# Patient Record
Sex: MALE
[De-identification: names, ages, dates, MRNs, and addresses within clinical notes are randomized per-mention and may not be internally consistent; named-entity substitution may affect disease eponyms.]

---

## 2020-08-05 NOTE — EDM.PDOC
ED HPI GENERAL MEDICAL PROBLEM





- General


Chief Complaint: General


Stated Complaint: ABDOMINAL PAIN


Time Seen by Provider: 08/05/20 21:50


Source of Information: Reports: Patient





- History of Present Illness


Onset: Today, Sudden


Duration: Hour(s): (abdominal pain with diarrea started late this morning and 

resolved around 2pm; had approximately 5 loose stools, non-bloody), Getting W

orse


Location: Reports: Abdomen


Quality: Reports: Same as Previous Episode, Sharp


Severity: Severe


Improves with: Reports: None


Worsens with: Reports: None


Associated Symptoms: Reports: No Other Symptoms


Other Treatments PTA: nexium


  ** Middle Abdomen


Pain Score (Numeric/FACES): 9





- Related Data


                                    Allergies











Allergy/AdvReac Type Severity Reaction Status Date / Time


 


Iodinated Contrast Media Allergy  Nausea Verified 08/05/20 21:48











Home Meds: 


                                    Home Meds





Cetirizine [ZyrTEC] 10 mg PO DAILY 08/05/20 [History]


Esomeprazole [NexIUM] 20 mg PO DAILY 08/05/20 [History]


Fluticasone Propionate [Flonase] 16 gm NS DAILY PRN 08/05/20 [History]


Levothyroxine 112 mcg PO ACBREAKFAST 08/05/20 [History]


Meloxicam 15 mg PO DAILY 08/05/20 [History]


Ranitidine [Zantac] 75 mg PO DAILY 08/05/20 [History]











Past Medical History


Gastrointestinal History: Reports: Cholelithiasis, GI Bleed, Irritable Bowel 

Syndrome, PUD





Social & Family History





- Tobacco Use


Smoking Status *Q: Never Smoker


Second Hand Smoke Exposure: No





- Caffeine Use


Caffeine Use: Reports: None





- Recreational Drug Use


Recreational Drug Use: No





ED ROS GENERAL





- Review of Systems


Review Of Systems: See Below


Constitutional: Denies: Fever, Weakness, Diaphoresis, Weight Loss


HEENT: Reports: No Symptoms


Respiratory: Reports: No Symptoms


Cardiovascular: Reports: No Symptoms


GI/Abdominal: Reports: Abdominal Pain, Diarrhea.  Denies: Black Stool, Bloody 

Stool, Nausea, Vomiting


: Reports: No Symptoms


Musculoskeletal: Reports: No Symptoms


Skin: Reports: No Symptoms


Neurological: Reports: No Symptoms


Psychiatric: Reports: No Symptoms


Hematologic/Lymphatic: Reports: No Symptoms





ED EXAM, GI/ABD





- Physical Exam


Exam: See Below


Exam Limited By: No Limitations


General Appearance: Alert, WD/WN, Severe Distress





Course





- Vital Signs


Last Recorded V/S: 


                                Last Vital Signs











Temp  97.9 F   08/05/20 21:45


 


Pulse  69   08/05/20 23:15


 


Resp  16   08/05/20 23:15


 


BP  107/70   08/05/20 23:15


 


Pulse Ox  100   08/05/20 23:15














- Orders/Labs/Meds


Orders: 


                               Active Orders 24 hr











 Category Date Time Status


 


 EKG Documentation Completion [RC] ASDIRECTED Care  08/05/20 22:39 Active


 


 Abdomen Pelvis wo Cont [CT] Stat Exams  08/05/20 22:05 Taken


 


 Chest 1V Frontal [CR] Stat Exams  08/05/20 22:36 Taken


 


 Peripheral IV Insertion Adult [OM.PC] Urgent Oth  08/05/20 22:05 Ordered











Labs: 


                                Laboratory Tests











  08/05/20 08/05/20 08/05/20 Range/Units





  22:40 22:40 22:45 


 


WBC  12.2 H D    (4.0-11.0)  K/uL


 


RBC  4.56    (4.50-6.50)  M/uL


 


Hgb  15.6    (13.0-18.0)  g/dL


 


Hct  43.3    (40.0-54.0)  %


 


MCV  95    (76-96)  fL


 


MCH  34.2 H    (27.0-32.0)  pg


 


MCHC  36.0 H    (31.0-35.0)  g/dL


 


RDW  12.1    (11.0-16.0)  %


 


Plt Count  188    (150-400)  K/uL


 


MPV  8.5    (6.0-10.0)  fL


 


Neut % (Auto)  75.8 H    (45.0-70.0)  %


 


Lymph % (Auto)  15.1 L    (20.0-40.0)  %


 


Mono % (Auto)  7.3    (3.0-10.0)  %


 


Eos % (Auto)  1.6    (1.0-5.0)  %


 


Baso % (Auto)  0.2    (0.0-0.5)  %


 


Neut # (Auto)  9.28 H    (2.00-7.50)  K/uL


 


Lymph # (Auto)  1.85    (1.50-4.00)  K/uL


 


Mono # (Auto)  0.89 H    (0.20-0.80)  K/uL


 


Eos # (Auto)  0.19    (0.04-0.40)  K/uL


 


Baso # (Auto)  0.02    (0.02-0.10)  K/uL


 


Sodium   140   (136-145)  mmol/L


 


Potassium   3.7   (3.5-5.1)  mmol/L


 


Chloride   104   ()  mmol/L


 


Carbon Dioxide   26.1   (21.0-32.0)  mmol/L


 


Anion Gap   13.6   (5.0-15.0)  mmol/L


 


BUN   20   (8-26)  mg/dL


 


Creatinine   1.20   (0.70-1.30)  mg/dL


 


Est Cr Clr Drug Dosing   74.91   mL/min


 


Estimated GFR (MDRD)   > 60   (>60)  MLS/MIN


 


BUN/Creatinine Ratio   16.7   (6-25)  


 


Glucose   104 H   ()  mg/dL


 


Calcium   8.7   (8.5-10.1)  mg/dL


 


Total Bilirubin   0.6   (0.0-1.0)  mg/dL


 


AST   49 H   (15-37)  U/L


 


ALT   44   (12-78)  U/L


 


Alkaline Phosphatase   116   ()  U/L


 


Total Protein   7.0   (6.4-8.2)  g/dL


 


Albumin   3.6   (3.4-5.0)  g/dL


 


Globulin   3.4   (2.2-4.2)  g/dL


 


Albumin/Globulin Ratio   1.1   (0.8-2.0)  


 


Amylase   166 H   ()  U/L


 


Lipase   1621 H* D   ()  U/L


 


Urine Color    Yellow  


 


Urine Appearance    Clear  (CLEAR)  


 


Urine pH    5.5  (5.0-8.0)  


 


Ur Specific Gravity    1.020  (1.003-1.030)  


 


Urine Protein    Negative  (NEGATIVE)  mg/dL


 


Urine Glucose (UA)    Negative  (NEGATIVE)  mg/dL


 


Urine Ketones    Negative  (NEGATIVE)  mg/dL


 


Urine Occult Blood    Negative  (NEGATIVE)  


 


Urine Nitrite    Negative  (NEGATIVE)  


 


Urine Bilirubin    Negative  (NEGATIVE)  


 


Urine Urobilinogen    0.2  (0.2-1.0)  E.U./dL


 


Ur Leukocyte Esterase    Negative  (NEGATIVE)  


 


Urine RBC    Not seen  /HPF


 


Urine WBC    Not seen  /HPF











Meds: 


Medications














Discontinued Medications














Generic Name Dose Route Start Last Admin





  Trade Name Freq  PRN Reason Stop Dose Admin


 


Sodium Chloride  1,000 mls @ 125 mls/hr  08/05/20 22:15  08/05/20 22:33





  Normal Saline  IV   125 mls/hr





  ASDIRECTED DAVIE   Administration


 


Morphine Sulfate  5 mg  08/05/20 22:03  08/05/20 22:08





  Morphine  IVPUSH  08/05/20 22:04  5 mg





  ONETIME ONE   Administration


 


Morphine Sulfate  Confirm  08/05/20 22:15  08/05/20 22:21





  Morphine  Administered  08/05/20 22:16  Not Given





  Dose  





  10 mg  





  .ROUTE  





  .STK-MED ONE  


 


Ondansetron HCl  4 mg  08/05/20 22:04  08/05/20 22:07





  Zofran  IVPUSH  08/05/20 22:05  4 mg





  ONETIME ONE   Administration


 


Pantoprazole Sodium  40 mg  08/05/20 22:15  08/05/20 22:33





  Protonix Iv***  IV   40 mg





  DAILY DAVIE   Administration


 


Sodium Chloride  10 ml  08/05/20 22:05 





  Saline Flush  FLUSH  





  ASDIRECTED PRN  





  Keep Vein Open  














- Radiology Interpretation


CT Results Date: 08/05/20


CT Results Time: 22:32





- Re-Assessments/Exams


Free Text/Narrative Re-Assessment/Exam: 





08/06/20 00:18


Radiologist read:Pancreas normal


Free Text/Narrative Re-Assessment/Exam: 





08/06/20 00:19


Pt feeling better.


Discussed about follow-up with primary Dr Loza in AM


Free Text/Narrative Re-Assessment/Exam: 





08/06/20 00:21


Pt was able to get out of bed and walk around.  States he is pain free.  I had 

discussed the case with Dr Jose Angel Murray [facility] who states patient if he felt able 

to go home with pain medication and followup with his primary Dr Loza in AM.





Departure





- Departure


Time of Disposition: 00:32


Disposition: Home, [facility]


Condition: Good


Clinical Impression: 


 Pain








- Discharge Information


*PRESCRIPTION DRUG MONITORING PROGRAM REVIEWED*: Not Applicable


*COPY OF PRESCRIPTION DRUG MONITORING REPORT IN PATIENT LINDY: Not Applicable


Instructions:  Pancreatitis Eating Plan


Referrals: 


PCP,None [Primary Care Provider] - 


Forms:  ED Department Discharge


Additional Instructions: 


*Take pain medication only if needed


*If pain worsens or persists return to ER or clinic


*Follow up in the clinic with your primary care provider


If you have any questions or concerns please call us at [phone]





Sepsis Event Note (ED)





- Focused Exam


Vital Signs: 


                                   Vital Signs











  Temp Pulse Resp BP Pulse Ox Pulse Ox


 


 08/05/20 23:15   69  16  107/70  100 


 


 08/05/20 23:10   70   95/48 L  98 


 


 08/05/20 23:00       98


 


 08/05/20 22:58   46 L  16  62/38 L  98 


 


 08/05/20 21:45  97.9 F  82  18  137/86  97 














- My Orders


Last 24 Hours: 


My Active Orders





08/05/20 22:05


Abdomen Pelvis wo Cont [CT] Stat 


Peripheral IV Insertion Adult [OM.PC] Urgent 





08/05/20 22:36


Chest 1V Frontal [CR] Stat 





08/05/20 22:39


EKG Documentation Completion [RC] ASDIRECTED 














- Assessment/Plan


Last 24 Hours: 


My Active Orders





08/05/20 22:05


Abdomen Pelvis wo Cont [CT] Stat 


Peripheral IV Insertion Adult [OM.PC] Urgent 





08/05/20 22:36


Chest 1V Frontal [CR] Stat 





08/05/20 22:39


EKG Documentation Completion [RC] ASDIRECTED

## 2020-08-06 NOTE — CR
DATE OF SERVICE:  08/05/20

CLINICAL DATA:  severe epigastric pain.



PA CHEST:  



No priors.



The heart size is normal.  



The lungs are clear.  No pneumothorax.  No pleural effusions.



No evidence of acute intrathoracic disease.  



[id number]

MTDD

## 2020-08-06 NOTE — CT
DATE OF SERVICE:  08/05/20

CLINICAL DATA:  Acute abdominal pain



UNENHANCED ABDOMEN AND PELVIC CT:



Multislice acquisition through the abdomen and pelvis without IV or oral 
contrast was performed.  



Comparison is made to a prior exam dated 02/05/19.



The lung bases are clear.  



The heart size is normal.  No significant pericardial effusion.  



There are surgical changes involving the GE junction.  There is gas noted within
the distal esophagus.  This is most likely related to GE reflux.



The unenhanced liver appears normal.  The patient is status post 
cholecystectomy.  



The spleen appears normal.  The pancreas appears normal.  The right and left 
adrenals appear normal.  



The right and left kidneys appear normal.  No nephrocalcinosis or 
nephrolithiasis.  No hydronephrosis or hydroureter.  



The bladder is fluid-filled.  It appears normal. 



No evidence of appendicitis.  



There are multiple fluid-filled loops of small bowel throughout the abdomen and 
pelvis.  They do contain scattered air-fluid levels.  Some of these are mildly 
distended.  No transition zone.  The findings most likely represent enteritis or
adynamic ileus.  An obstructive process cannot be completely excluded.  Followup
imaging is recommended if clinically indicated. 



No free air.  No free fluid.  No adenopathy.  No aortic aneurysm. 



There is a fat-containing umbilical hernia.  



[id number]

[facility]

## 2020-08-13 NOTE — EDM.PDOC
ED HPI GENERAL MEDICAL PROBLEM





- General


Chief Complaint: General


Stated Complaint: STOMACH PAIN


Time Seen by Provider: 08/13/20 10:51


Source of Information: Reports: Patient


History Limitations: Reports: No Limitations





- History of Present Illness


INITIAL COMMENTS - FREE TEXT/NARRATIVE: 





Pt presents with recurrent epigastric pain which began last week and was seen in

our ER with a workup which included labs and CT of abdomen.  Lab values and CT 

being normal was discharged with diagnosis of acute pancreatitis.  See prior ER 

workup.  Pt states he has been following a mostly liquid diet with some chicken 

and thought he was improving until last night when intense epigastric pain 

returned keeping him a wake most of the night.  State he took the Percoset he 

was prescribed but states the side effects makes him feel like a Zombie.


Onset Date: 08/06/20


Onset Time: 14:00


Duration: Week(s):


Location: Reports: Abdomen


Quality: Reports: Same as Previous Episode


Severity: Moderate


Improves with: Reports: Medication, Rest, Other (some improvement temporarily 

with tylenol)


Associated Symptoms: Reports: No Other Symptoms


Treatments PTA: Reports: Acetaminophen





- Related Data


                                    Allergies











Allergy/AdvReac Type Severity Reaction Status Date / Time


 


Iodinated Contrast Media Allergy  Nausea Verified 08/05/20 21:48











Home Meds: 


                                    Home Meds





Cetirizine [ZyrTEC] 10 mg PO DAILY 08/05/20 [History]


Esomeprazole [NexIUM] 20 mg PO DAILY 08/05/20 [History]


Fluticasone Propionate [Flonase] 16 gm NS DAILY PRN 08/05/20 [History]


Levothyroxine 112 mcg PO ACBREAKFAST 08/05/20 [History]


Meloxicam 15 mg PO DAILY 08/05/20 [History]


Ranitidine [Zantac] 75 mg PO DAILY 08/05/20 [History]











Past Medical History


HEENT History: Reports: Allergic Rhinitis


Respiratory History: Reports: Asthma


Gastrointestinal History: Reports: Cholelithiasis, GI Bleed, Irritable Bowel 

Syndrome, PUD


Endocrine/Metabolic History: Reports: Hypothyroidism





- Past Surgical History


GI Surgical History: Reports: Cholecystectomy


Endocrine Surgical History: Reports: Thyroidectomy





Social & Family History





- Family History


Family Medical History: Noncontributory





- Caffeine Use


Caffeine Use: Reports: None





ED ROS GENERAL





- Review of Systems


Review Of Systems: See Below


Constitutional: Reports: No Symptoms


HEENT: Reports: No Symptoms


Respiratory: Reports: No Symptoms


Cardiovascular: Reports: No Symptoms


Endocrine: Reports: No Symptoms


GI/Abdominal: Reports: Abdominal Pain, Nausea


: Reports: No Symptoms


Musculoskeletal: Reports: No Symptoms


Skin: Reports: No Symptoms


Neurological: Reports: No Symptoms


Psychiatric: Reports: No Symptoms





ED EXAM, GI/ABD





- Physical Exam


Exam: See Below


Exam Limited By: No Limitations


General Appearance: Alert, WD/WN, Mild Distress


Ears: Normal External Exam


Nose: Normal Inspection


Throat/Mouth: Normal Inspection


Head: Atraumatic


Neck: Normal Inspection


Respiratory/Chest: No Respiratory Distress


Cardiovascular: Normal Peripheral Pulses


GI/Abdominal Exam: Normal Bowel Sounds, Soft, Tender.  No: No Organomegaly, No 

Distention, Guarding, Rigid, Abnormal Bowel Sounds, Mass


Neurological: Alert, Oriented, CN II-XII Intact, Normal Cognition, Normal Gait


Psychiatric: Normal Affect


Skin Exam: Warm, Dry, Intact, Normal Color, No Rash


Lymphatic: No Adenopathy





Course





- Vital Signs


Last Recorded V/S: 


                                Last Vital Signs











Temp  97.7 F   08/13/20 10:44


 


Pulse  59 L  08/13/20 10:44


 


Resp  20   08/13/20 10:44


 


BP  134/81   08/13/20 10:44


 


Pulse Ox  100   08/13/20 10:44














- Orders/Labs/Meds


Labs: 


                                Laboratory Tests











  08/13/20 Range/Units





  11:15 


 


Amylase  65  D  ()  U/L


 


Lipase  112  D  ()  U/L











Meds: 


Medications














Discontinued Medications














Generic Name Dose Route Start Last Admin





  Trade Name Freq  PRN Reason Stop Dose Admin


 


Famotidine  20 mg  08/13/20 12:00  08/13/20 11:38





  Pepcid  IVPUSH   20 mg





  BID DAVIE   Administration


 


Sodium Chloride  500 mls @ 999 mls/hr  08/13/20 11:15 





  Normal Saline  IV  08/13/20 11:46 





  BOLUS DAVIE  


 


Morphine Sulfate  5 mg  08/13/20 11:05 





  Morphine  IV  08/13/20 11:06 





  ONETIME ONE  


 


Morphine Sulfate  Confirm  08/13/20 11:23 





  Morphine  Administered  08/13/20 11:24 





  Dose  





  10 mg  





  .ROUTE  





  .STK-MED ONE  


 


Morphine Sulfate  5 mg  08/13/20 11:22  08/13/20 11:23





  Morphine  IVPUSH  08/13/20 11:23  5 mg





  ONETIME ONE   Administration














- Re-Assessments/Exams


Free Text/Narrative Re-Assessment/Exam: 





08/13/20 11:48


Pt states he is beginning to feel better.  Nausea has resolved and his 

epigastric pain has improved a lot.  He has received a bolus of NS and his labs 

values look good.  See results.





Departure





- Departure


Time of Disposition: 12:20


Disposition: Home, Self-Care 01


Condition: Good


Clinical Impression: 


 Abdominal pain








- Discharge Information


*PRESCRIPTION DRUG MONITORING PROGRAM REVIEWED*: No


*COPY OF PRESCRIPTION DRUG MONITORING REPORT IN PATIENT LINDY: No


Instructions:  Abdominal Pain, Adult, Easy-to-Read


Referrals: 


PCP,None [Primary Care Provider] - 


Forms:  ED Department Discharge


Additional Instructions: 


Keep appointment as scheduled with GI on 8/28/20


Care Plan Goals: 


You have received medications and fluids as a result of an acute episode of 

pancreatitis with stated relief of symptoms (pain). Your provider will make 

adjustments to your pain management as you have hadn adequate relief provided by

morphine sulfate. Your pancreatic enzymes are lower than your [id number] visit 

but still warrant additional follow-up with your GI specialist. if pain persists

or worsens, please call the hospital. if an emergency, call 911.





Sepsis Event Note (ED)





- Evaluation


Sepsis Screening Result: No Definite Risk





- Focused Exam


Vital Signs: 


                                   Vital Signs











  Temp Pulse Resp BP Pulse Ox


 


 08/13/20 10:44  97.7 F  59 L  20  134/81  100

## 2021-12-06 NOTE — CT
DATE OF SERVICE:  12/06/2021

CLINICAL DATA:  Abdominal Pain



Unenhanced abdomen and pelvic CT:



The multi slice acquisition through the abdomen and pelvis without IV or oral 
contrast was performed.



Comparison is made to a prior exam dated 5 August 2020.



The lung bases are clear.



The heart size is normal.



The there are surgical changes in the region the GE junction. There is a small 
hiatal hernia.



There is mild diffuse fatty infiltration of the liver. No focal hepatic lesions.
The patient is status post cholecystectomy.



The spleen appears normal. There is a 16 mm nodule adjacent to the lower pole of
the spleen consistent with an accessory spleen.



There is mild atrophy of the pancreas. Otherwise unremarkable.



The right and left adrenals appear normal.



The unenhanced kidneys appear normal. No nephrocalcinosis or nephrolithiasis. No
hydronephrosis or hydroureter.



The bladder is partially fluid filled. It appears normal.



The appendix is not visualized and there is a surgical clip adjacent to the 
cecum.



The cecum, ascending colon, and transverse colon are fluid and gas filled and 
contain multiple airfluid levels. There also multiple gas and fluid-filled loops
of small bowel throughout the abdomen that contain air-fluid levels. They are 
not distended. Enterocolitis is suspected. Follow-up imaging is recommended if 
clinically indicated.



There is a moderate amount stool noted within the descending and sigmoid colon 
and rectum.



No free air. No free fluid. No dilated loops of bowel. No adenopathy. No aortic 
aneurysm.



There are bilateral fat containing inguinal hernias. There is a small fat 
containing umbilical hernia.



No other significant findings

MTDD

## 2021-12-06 NOTE — EDM.PDOC
ED HPI GENERAL MEDICAL PROBLEM





- General


Chief Complaint: Abdominal Pain


Stated Complaint: STOMACH PAIN


Time Seen by Provider: 12/06/21 15:00


Source of Information: Reports: Patient, RN Notes Reviewed


History Limitations: Reports: No Limitations





- History of Present Illness


INITIAL COMMENTS - FREE TEXT/NARRATIVE: 





This patient presents to the emergency department for evaluation of abdominal 

pain.  He states the abdominal pain started at about 5 AM and he came into the 

emergency room earlier today but then had some vomiting which actually made him 

feel better.  After that he decided to go back home.  Since he returned home he 

has had another increase in the pain and decided to come back in.  Aside from 

the episode of vomiting late this morning he has had no further vomiting.  He 

denies nausea at this time.  He states he had a normal stool this morning and 

has been stooling regularly.  He has not had a fever with this.  He states he 

used to have abdominal pain regularly and has not had any in the past 2 years 

since having his gallbladder removed.  He denies other concerns or complaints.


Treatments PTA: Reports: Other (see below)


Other Treatments PTA: Tylenol with codiene, 2 tabs at 0300 and one tab at 0600. 

Only had 3 tabs





- Related Data


                                    Allergies











Allergy/AdvReac Type Severity Reaction Status Date / Time


 


Iodinated Contrast Media Allergy  Nausea Verified 12/06/21 15:28











Home Meds: 


                                    Home Meds





Cetirizine [ZyrTEC] 10 mg PO DAILY 08/05/20 [History]


Esomeprazole [NexIUM] 40 mg PO DAILY 08/05/20 [History]


Fluticasone Propionate [Flonase] 16 gm NS DAILY PRN 08/05/20 [History]


Levothyroxine 135 mcg PO ACBREAKFAST 08/05/20 [History]


Meloxicam 15 mg PO DAILY 08/05/20 [History]


Aspirin [Halfprin] 81 mg PO DAILY 12/06/21 [History]











Past Medical History


HEENT History: Reports: Allergic Rhinitis


Respiratory History: Reports: Asthma


Gastrointestinal History: Reports: Cholelithiasis, GI Bleed, Irritable Bowel 

Syndrome, PUD


Endocrine/Metabolic History: Reports: Hypothyroidism





- Past Surgical History


GI Surgical History: Reports: Cholecystectomy


Endocrine Surgical History: Reports: Thyroidectomy





Social & Family History





- Family History


Family Medical History: No Pertinent Family History





- Caffeine Use


Caffeine Use: Reports: None





ED ROS GENERAL





- Review of Systems


Review Of Systems: See Below


Constitutional: Reports: Decreased Appetite.  Denies: Fever


HEENT: Reports: No Symptoms


Respiratory: Reports: No Symptoms


Cardiovascular: Reports: No Symptoms


GI/Abdominal: Reports: Abdominal Pain, Decreased Appetite, Nausea, Vomiting.  

Denies: Constipation, Diarrhea


Skin: Reports: Dryness


Neurological: Reports: No Symptoms





ED EXAM, GI/ABD





- Physical Exam


Exam: See Below


Exam Limited By: No Limitations


General Appearance: Alert, No Apparent Distress, Anxious


Eyes: Bilateral: Normal Appearance


Ears: Normal External Exam (Nurses station nurses station this is very)





Course





- Orders/Labs/Meds


Orders: 


                               Active Orders 24 hr











 Category Date Time Status


 


 Ondansetron [Zofran] Med  12/06/21 14:39 Active





 4 mg IVPUSH Q4H PRN   


 


 Sodium Chloride 0.9% [Normal Saline] 1,000 ml Med  12/06/21 14:45 Active





 IV ASDIRECTED   


 


 Sodium Chloride 0.9% [Saline Flush] Med  12/06/21 14:39 Active





 10 ml FLUSH ASDIRECTED PRN   


 


 Saline Lock Insert [OM.PC] Stat Oth  12/06/21 14:39 Ordered








                                Medication Orders





Sodium Chloride (Normal Saline)  1,000 mls @ 1,000 mls/hr IV ASDIRECTED DAVIE


   Last Admin: 12/06/21 15:12  Dose: 1,000 mls/hr


   Documented by: JOSE ALFREDO


Ondansetron HCl (Ondansetron 4 Mg/2 Ml Sdv)  4 mg IVPUSH Q4H PRN


   PRN Reason: Nausea/Vomiting


   Last Admin: 12/06/21 15:06  Dose: 4 mg


   Documented by: JOSE ALFREDO


Sodium Chloride (Sodium Chloride 0.9% 10 Ml Syringe)  10 ml FLUSH ASDIRECTED PRN


   PRN Reason: Keep Vein Open








Labs: 


                                Laboratory Tests











  12/06/21 12/06/21 12/06/21 Range/Units





  14:39 14:45 14:45 


 


WBC   17.5 H D   (4.0-11.0)  K/uL


 


RBC   4.55   (4.50-6.50)  M/uL


 


Hgb   15.3   (13.0-18.0)  g/dL


 


Hct   44.7   (40.0-54.0)  %


 


MCV   98 H   (76-96)  fL


 


MCH   33.6 H   (27.0-32.0)  pg


 


MCHC   34.2   (31.0-35.0)  g/dL


 


RDW   12.4   (11.0-16.0)  %


 


Plt Count   234   (150-400)  K/uL


 


MPV   8.3   (6.0-10.0)  fL


 


Neut % (Auto)   87.8 H   (45.0-70.0)  %


 


Lymph % (Auto)   4.2 L   (20.0-40.0)  %


 


Mono % (Auto)   7.4   (3.0-10.0)  %


 


Eos % (Auto)   0.5 L   (1.0-5.0)  %


 


Baso % (Auto)   0.1   (0.0-0.5)  %


 


Neut # (Auto)   15.34 H   (2.00-7.50)  K/uL


 


Lymph # (Auto)   0.74 L   (1.50-4.00)  K/uL


 


Mono # (Auto)   1.29 H   (0.20-0.80)  K/uL


 


Eos # (Auto)   0.08   (0.04-0.40)  K/uL


 


Baso # (Auto)   0.01 L   (0.02-0.10)  K/uL


 


Sodium    142  (136-145)  mmol/L


 


Potassium    4.1  (3.5-5.1)  mmol/L


 


Chloride    104  ()  mmol/L


 


Carbon Dioxide    27.6  (21.0-32.0)  mmol/L


 


Anion Gap    14.5  (5.0-15.0)  mmol/L


 


BUN    36 H D  (8-26)  mg/dL


 


Creatinine    1.15  (0.70-1.30)  mg/dL


 


Est Cr Clr Drug Dosing    TNP  


 


Estimated GFR (MDRD)    > 60  (>60)  MLS/MIN


 


BUN/Creatinine Ratio    31.3 H  (6-25)  


 


Glucose    146 H  ()  mg/dL


 


Calcium    8.2 L  (8.5-10.1)  mg/dL


 


Total Bilirubin    0.8  D  (0.0-1.0)  mg/dL


 


AST    40 H  (15-37)  U/L


 


ALT    66  (12-78)  U/L


 


Alkaline Phosphatase    122 H  ()  U/L


 


Total Protein    6.8  (6.4-8.2)  g/dL


 


Albumin    3.3 L  (3.4-5.0)  g/dL


 


Globulin    3.5  (2.2-4.2)  g/dL


 


Albumin/Globulin Ratio    0.9  (0.8-2.0)  


 


Urine Color  Yellow    


 


Urine Appearance  Clear    (CLEAR)  


 


Urine pH  6.0    (5.0-8.0)  


 


Ur Specific Gravity  1.025    (1.003-1.030)  


 


Urine Protein  Negative    (NEGATIVE)  mg/dL


 


Urine Glucose (UA)  Negative    (NEGATIVE)  mg/dL


 


Urine Ketones  Trace H    (NEGATIVE)  mg/dL


 


Urine Occult Blood  Negative    (NEGATIVE)  


 


Urine Nitrite  Negative    (NEGATIVE)  


 


Urine Bilirubin  Negative    (NEGATIVE)  


 


Urine Urobilinogen  0.2    (0.2-1.0)  E.U./dL


 


Ur Leukocyte Esterase  Negative    (NEGATIVE)  











Meds: 


Medications











Generic Name Dose Route Start Last Admin





  Trade Name Freq  PRN Reason Stop Dose Admin


 


Sodium Chloride  1,000 mls @ 1,000 mls/hr  12/06/21 14:45  12/06/21 15:12





  Normal Saline  IV   1,000 mls/hr





  ASDIRECTED DAVIE   Administration


 


Ondansetron HCl  4 mg  12/06/21 14:39  12/06/21 15:06





  Ondansetron 4 Mg/2 Ml Sdv  IVPUSH   4 mg





  Q4H PRN   Administration





  Nausea/Vomiting  


 


Sodium Chloride  10 ml  12/06/21 14:39 





  Sodium Chloride 0.9% 10 Ml Syringe  FLUSH  





  ASDIRECTED PRN  





  Keep Vein Open  














Discontinued Medications














Generic Name Dose Route Start Last Admin





  Trade Name Freq  PRN Reason Stop Dose Admin


 


Ondansetron HCl  Confirm  12/06/21 15:14  12/06/21 15:07





  Ondansetron 4 Mg/2 Ml Sdv  Administered  12/06/21 15:15  Not Given





  Dose  





  4 mg  





  .ROUTE  





  .STK-MED ONE  














- Re-Assessments/Exams


Free Text/Narrative Re-Assessment/Exam: 


This patient presents to the emergency department for evaluation of abdominal 

pain.  The differential for abdominal pain is broad and includes such etiologies

 as diverticulitis, colitis, enterocolitis, appendicitis, functional bowel 

disease, constipation, UTI, GIB, pyelonephritis, ureterolithiasis, hernia, etc. 

peer in addition rare and serious causes were considered as well in this patient

 including volvulus, abscess, aneurysm, mesenteric ischemia.  The history and 

clinical findings here today are most consistent with enterocolitis.  Of concern

 is a white blood count of 17,000.  Patient states that he was given steroids 

for "a cold" last week by his primary care provider.  It is possible that the 

white count is elevated related to that medication.  He has no evidence of a 

serious bacterial illness with the exception of this white count.  Exception of 

the patient does look well and at this point has a very reassuring physical 

examination and I will not admit him for serial exams and further work-up.  He 

was discharged to home with instructions to use a clear liquid diet tonight and 

advance his diet gradually tomorrow.  He is in hemodynamically stable.  He was 

instructed to return for fevers greater than 102, increasing pain, or the 

development of other new symptoms.  The patient was stable at the time of 

discharge.


12/06/21 17:47








Departure





- Departure


Time of Disposition: 16:15


Disposition: Home, Self-Care 01


Condition: Good


Clinical Impression: 


 Enterocolitis








- Discharge Information


Instructions:  Nausea and Vomiting, Adult, Easy-to-Read


Referrals: 


Guillaume Loza MD [Primary Care Provider] - 


Forms:  ED Department Discharge


Additional Instructions: 


Clear liquid diet tonight and advanced tomorrow as tolerated. If abd pain 

returns or symptoms increase follow up with Dr Loza.





Sepsis Event Note (ED)





- Evaluation


Sepsis Screening Result: No Definite Risk





- My Orders


Last 24 Hours: 


My Active Orders





12/06/21 14:39


Ondansetron [Zofran]   4 mg IVPUSH Q4H PRN 


Sodium Chloride 0.9% [Saline Flush]   10 ml FLUSH ASDIRECTED PRN 


Saline Lock Insert [OM.PC] Stat 





12/06/21 14:45


Sodium Chloride 0.9% [Normal Saline] 1,000 ml IV ASDIRECTED 














- Assessment/Plan


Last 24 Hours: 


My Active Orders





12/06/21 14:39


Ondansetron [Zofran]   4 mg IVPUSH Q4H PRN 


Sodium Chloride 0.9% [Saline Flush]   10 ml FLUSH ASDIRECTED PRN 


Saline Lock Insert [OM.PC] Stat 





12/06/21 14:45


Sodium Chloride 0.9% [Normal Saline] 1,000 ml IV ASDIRECTED

## 2021-12-08 NOTE — EDM.PDOC
ED HPI GENERAL MEDICAL PROBLEM





- General


Chief Complaint: Respiratory Problem


Stated Complaint: SOB


Time Seen by Provider: 12/08/21 01:40


Source of Information: Reports: Patient, EMS, EMS Notes Reviewed, RN Notes 

Reviewed


History Limitations: Reports: No Limitations





- History of Present Illness


INITIAL COMMENTS - FREE TEXT/NARRATIVE: 


This patient presents to the emergency department in the care of EMS for 

evaluation of difficulty breathing.  EMS reports they were called to the 

residence when he woke up, had a coughing episode and was unable to catch his 

breath.  On their arrival they found him struggling to breathe with loud upper 

airway noises.  They stated they started him on oxygen by nonrebreather and 

brought him directly in.  He was seen in this ER yesterday for abdominal pain 

which he was quite panicked about.  He was found to have enterocolitis with some

constipation and was discharged to home.  He has had a history of recurrent 

abdominal problems resulting in some surgeries, none of which have occurred in 

the past 2 years.  According to wife he woke and went to drink some water and 

then started to choke and have a coughing episode.  She states that he does 

sometimes panic and he was having trouble catching his breath so she called 911.

 Patient states he was not able to breathe at all.  He is also complaining of 

numbness and tingling in his fingertips.








- Related Data


                                    Allergies











Allergy/AdvReac Type Severity Reaction Status Date / Time


 


Iodinated Contrast Media Allergy  Nausea Verified 12/08/21 01:38











Home Meds: 


                                    Home Meds





Cetirizine [ZyrTEC] 10 mg PO DAILY 08/05/20 [History]


Esomeprazole [NexIUM] 40 mg PO DAILY 08/05/20 [History]


Fluticasone Propionate [Flonase] 16 gm NS DAILY PRN 08/05/20 [History]


Levothyroxine 135 mcg PO ACBREAKFAST 08/05/20 [History]


Meloxicam 15 mg PO DAILY 08/05/20 [History]


Aspirin [Halfprin] 81 mg PO DAILY 12/06/21 [History]











Past Medical History


HEENT History: Reports: Allergic Rhinitis


Cardiovascular History: Reports: None


Respiratory History: Reports: Asthma


Gastrointestinal History: Reports: Cholelithiasis, GI Bleed, Irritable Bowel 

Syndrome, PUD


Genitourinary History: Reports: None


Musculoskeletal History: Reports: None


Neurological History: Reports: None


Psychiatric History: Reports: None


Endocrine/Metabolic History: Reports: Hypothyroidism


Immunologic History: Reports: None


Oncologic (Cancer) History: Reports: None


Dermatologic History: Reports: None





- Past Surgical History


GI Surgical History: Reports: Cholecystectomy


Endocrine Surgical History: Reports: Thyroidectomy





Social & Family History





- Family History


Family Medical History: No Pertinent Family History





- Caffeine Use


Caffeine Use: Reports: Coffee





ED ROS GENERAL





- Review of Systems


Review Of Systems: See Below


Constitutional: Reports: Decreased Appetite.  Denies: Fever, Weakness


HEENT: Reports: No Symptoms


Respiratory: Reports: Shortness of Breath, Cough


Cardiovascular: Reports: Chest Pain ("From breathing so heavy")


GI/Abdominal: Reports: Diarrhea, Decreased Appetite, Nausea (An episode of 

nausea earlier this evening).  Denies: Abdominal Pain, Vomiting


Musculoskeletal: Reports: No Symptoms


Skin: Reports: No Symptoms


Neurological: Reports: No Symptoms


Psychiatric: Reports: Agitation, Anxiety





ED EXAM, GENERAL





- Physical Exam


Exam: See Below


Exam Limited By: No Limitations


General Appearance: Alert, No Apparent Distress, Anxious, Severe Distress, Other

 (Forced upper airway inspiratory and expiratory sounds)


Eye Exam: Bilateral Eye: EOMI, PERRL


Ears: Normal External Exam


Nose: Normal Inspection


Throat/Mouth: Normal Inspection


Head: Atraumatic, Normocephalic


Neck: Normal Inspection, Supple, Non-Tender, Full Range of Motion


Respiratory/Chest: No Respiratory Distress, Lungs Clear, Normal Breath Sounds, 

No Accessory Muscle Use


Cardiovascular: Normal Peripheral Pulses, Regular Rate, Rhythm


Peripheral Pulses: 4+: Radial (L), Radial (R), Dorsalis Pedis (L), Dorsalis 

Pedis (R)


Extremities: Normal Inspection, Normal Capillary Refill, Other (Tingling in 

fingertips)


Neurological: Alert, Oriented


Psychiatric: Anxious, Tearful


Skin Exam: Warm, Dry, Intact, Normal Color





Course





- Vital Signs


Last Recorded V/S: 


                                Last Vital Signs











Temp  36.2 C   12/08/21 01:33


 


Pulse  94   12/08/21 01:56


 


Resp  18   12/08/21 01:56


 


BP  130/75   12/08/21 01:56


 


Pulse Ox  99   12/08/21 01:56














- Orders/Labs/Meds


Orders: 


                               Active Orders 24 hr











 Category Date Time Status


 


 RT Aerosol Therapy [RC] ASDIRECTED Care  12/08/21 01:42 Active


 


 Albuterol/Ipratropium [DuoNeb 3.0-0.5 MG/3 ML] Med  12/08/21 01:40 Active





 3 ml NEB Q2H PRN   








                                Medication Orders





Albuterol/Ipratropium (Albuterol/Ipratropium 3.0-0.5 Mg/3 Ml Neb Soln)  3 ml NEB

Q2H PRN


   PRN Reason: Shortness of Breath


   Last Admin: 12/08/21 01:39  Dose: 3 ml


   Documented by: WILBER








Meds: 


Medications











Generic Name Dose Route Start Last Admin





  Trade Name Freq  PRN Reason Stop Dose Admin


 


Albuterol/Ipratropium  3 ml  12/08/21 01:40  12/08/21 01:39





  Albuterol/Ipratropium 3.0-0.5 Mg/3 Ml Neb Soln  NEB   3 ml





  Q2H PRN   Administration





  Shortness of Breath  














Discontinued Medications














Generic Name Dose Route Start Last Admin





  Trade Name Freq  PRN Reason Stop Dose Admin


 


Lorazepam  1 mg  12/08/21 01:46  12/08/21 01:46





  Lorazepam 1 Mg Tab  PO  12/08/21 01:47  1 mg





  ONETIME ONE   Administration














- Re-Assessments/Exams


Free Text/Narrative Re-Assessment/Exam: 





12/08/21 02:16


This patient presents to the emergency department for evaluation of difficulty 

breathing.  History and clinical findings are most consistent with an esophageal

spasm followed by some hyperventilation.  He denies any chest pain.  I did 

consider a variety o diagnoses for this episode including acute coronary 

syndrome,  PE, spontaneous pneumothorax, esophageal rupture, pneumonia, GERD, 

musculoskeletal pain, aortic aneurysm.  There is no evidence of a serious 

etiology for this presentation.  The situation was quickly resolved with 

redirection and calming; patient was given one duoneb.  He has a very reassuring

physical examination with normal vital signs and a normal oxygen saturation in 

room air.  He was given 1 mg of Ativan for anxiety and was significantly more 

calm at the time of discharge.  He was instructed to return to his primary care 

provider in 2 to 3 days should he have any other concerns return to the ER for 

any difficulty breathing.


12/08/21 02:21








Departure





- Departure


Time of Disposition: 02:30


Disposition: Home, Self-Care 01


Condition: Good


Clinical Impression: 


 Esophageal spasm, Hyperventilation








- Discharge Information


*PRESCRIPTION DRUG MONITORING PROGRAM REVIEWED*: Not Applicable


*COPY OF PRESCRIPTION DRUG MONITORING REPORT IN PATIENT LINDY: Not Applicable


Instructions:  Esophageal Spasm


Forms:  ED Department Discharge





Sepsis Event Note (ED)





- Focused Exam


Vital Signs: 


                                   Vital Signs











  Temp Pulse Resp BP Pulse Ox


 


 12/08/21 01:56   94  18  130/75  99


 


 12/08/21 01:33  36.2 C  100  20  127/91 H  99














- My Orders


Last 24 Hours: 


My Active Orders





12/08/21 01:40


Albuterol/Ipratropium [DuoNeb 3.0-0.5 MG/3 ML]   3 ml NEB Q2H PRN 





12/08/21 01:42


RT Aerosol Therapy [RC] ASDIRECTED 














- Assessment/Plan


Last 24 Hours: 


My Active Orders





12/08/21 01:40


Albuterol/Ipratropium [DuoNeb 3.0-0.5 MG/3 ML]   3 ml NEB Q2H PRN 





12/08/21 01:42


RT Aerosol Therapy [RC] ASDIRECTED

## 2023-02-20 ENCOUNTER — HOSPITAL ENCOUNTER (EMERGENCY)
Dept: HOSPITAL 60 - LB.ED | Age: 63
Discharge: HOME | End: 2023-02-20
Payer: COMMERCIAL

## 2023-02-20 DIAGNOSIS — E03.9: ICD-10-CM

## 2023-02-20 DIAGNOSIS — Z79.82: ICD-10-CM

## 2023-02-20 DIAGNOSIS — Z79.899: ICD-10-CM

## 2023-02-20 DIAGNOSIS — Z91.041: ICD-10-CM

## 2023-02-20 DIAGNOSIS — R00.2: Primary | ICD-10-CM

## 2023-02-22 ENCOUNTER — HOSPITAL ENCOUNTER (EMERGENCY)
Dept: HOSPITAL 60 - LB.ED | Age: 63
Discharge: HOME | End: 2023-02-22
Payer: COMMERCIAL

## 2023-02-22 DIAGNOSIS — J45.909: ICD-10-CM

## 2023-02-22 DIAGNOSIS — R07.89: Primary | ICD-10-CM

## 2023-02-22 DIAGNOSIS — Z79.899: ICD-10-CM

## 2023-02-22 DIAGNOSIS — E03.9: ICD-10-CM

## 2023-02-22 DIAGNOSIS — Z91.041: ICD-10-CM

## 2023-02-22 DIAGNOSIS — R00.0: ICD-10-CM

## 2023-02-22 DIAGNOSIS — Z79.82: ICD-10-CM

## 2023-02-22 LAB — TROPONIN I SERPL HS-MCNC: 4.6 PG/ML (ref ?–60.4)

## 2023-02-22 RX ADMIN — ADENOSINE ONE MG: 3 INJECTION, SOLUTION INTRAVENOUS at 10:51

## 2023-02-22 RX ADMIN — ADENOSINE ONE: 3 INJECTION, SOLUTION INTRAVENOUS at 12:19

## 2023-02-22 RX ADMIN — NITROGLYCERIN PRN MG: 0.4 TABLET SUBLINGUAL at 10:19

## 2023-02-22 RX ADMIN — DILTIAZEM HYDROCHLORIDE ONE MG: 5 INJECTION, SOLUTION INTRAVENOUS at 10:56

## 2023-02-23 ENCOUNTER — HOSPITAL ENCOUNTER (INPATIENT)
Dept: HOSPITAL 60 - LB.ED | Age: 63
LOS: 1 days | Discharge: HOME | DRG: 201 | End: 2023-02-24
Attending: PHYSICIAN ASSISTANT | Admitting: PHYSICIAN ASSISTANT
Payer: COMMERCIAL

## 2023-02-23 DIAGNOSIS — H54.7: ICD-10-CM

## 2023-02-23 DIAGNOSIS — I48.0: ICD-10-CM

## 2023-02-23 DIAGNOSIS — H91.90: ICD-10-CM

## 2023-02-23 DIAGNOSIS — Z79.51: ICD-10-CM

## 2023-02-23 DIAGNOSIS — Z90.49: ICD-10-CM

## 2023-02-23 DIAGNOSIS — Z79.899: ICD-10-CM

## 2023-02-23 DIAGNOSIS — J45.909: ICD-10-CM

## 2023-02-23 DIAGNOSIS — Z90.89: ICD-10-CM

## 2023-02-23 DIAGNOSIS — Z79.82: ICD-10-CM

## 2023-02-23 DIAGNOSIS — E03.9: ICD-10-CM

## 2023-02-23 DIAGNOSIS — I48.92: Primary | ICD-10-CM

## 2023-02-23 LAB — TROPONIN I SERPL HS-MCNC: < 4 PG/ML (ref ?–60.4)

## 2023-02-23 RX ADMIN — DILTIAZEM HYDROCHLORIDE SCH MG: 120 CAPSULE, COATED, EXTENDED RELEASE ORAL at 18:32

## 2023-02-23 RX ADMIN — ALOGLIPTIN SCH PUFF: 25 TABLET, FILM COATED ORAL at 20:12

## 2023-02-24 VITALS — DIASTOLIC BLOOD PRESSURE: 71 MMHG | SYSTOLIC BLOOD PRESSURE: 123 MMHG

## 2023-02-24 VITALS — HEART RATE: 60 BPM

## 2023-02-24 RX ADMIN — ALOGLIPTIN SCH: 25 TABLET, FILM COATED ORAL at 06:12

## 2023-02-24 RX ADMIN — ALOGLIPTIN SCH MG: 25 TABLET, FILM COATED ORAL at 07:29

## 2023-02-24 RX ADMIN — ALOGLIPTIN SCH PUFF: 25 TABLET, FILM COATED ORAL at 07:29

## 2023-02-24 RX ADMIN — DILTIAZEM HYDROCHLORIDE SCH MG: 120 CAPSULE, COATED, EXTENDED RELEASE ORAL at 07:28

## 2023-02-25 ENCOUNTER — HOSPITAL ENCOUNTER (EMERGENCY)
Dept: HOSPITAL 60 - LB.ED | Age: 63
Discharge: HOME | End: 2023-02-25
Payer: COMMERCIAL

## 2023-02-25 DIAGNOSIS — J45.909: ICD-10-CM

## 2023-02-25 DIAGNOSIS — Z91.041: ICD-10-CM

## 2023-02-25 DIAGNOSIS — I48.91: ICD-10-CM

## 2023-02-25 DIAGNOSIS — E03.9: ICD-10-CM

## 2023-02-25 DIAGNOSIS — Z79.899: ICD-10-CM

## 2023-02-25 DIAGNOSIS — Z79.82: ICD-10-CM

## 2023-02-25 DIAGNOSIS — I48.92: ICD-10-CM

## 2023-02-25 DIAGNOSIS — Z79.01: ICD-10-CM

## 2023-02-25 DIAGNOSIS — R07.9: Primary | ICD-10-CM

## 2023-02-25 PROCEDURE — 71250 CT THORAX DX C-: CPT

## 2023-02-25 PROCEDURE — 85610 PROTHROMBIN TIME: CPT

## 2023-02-25 PROCEDURE — 85025 COMPLETE CBC W/AUTO DIFF WBC: CPT

## 2023-02-25 PROCEDURE — 99285 EMERGENCY DEPT VISIT HI MDM: CPT

## 2023-02-25 PROCEDURE — 93005 ELECTROCARDIOGRAM TRACING: CPT

## 2023-02-25 PROCEDURE — 84484 ASSAY OF TROPONIN QUANT: CPT

## 2023-02-25 PROCEDURE — 80053 COMPREHEN METABOLIC PANEL: CPT

## 2023-02-25 PROCEDURE — 36415 COLL VENOUS BLD VENIPUNCTURE: CPT

## 2023-04-03 ENCOUNTER — HOSPITAL ENCOUNTER (EMERGENCY)
Dept: HOSPITAL 60 - LB.ED | Age: 63
Discharge: HOME | End: 2023-04-03
Payer: COMMERCIAL

## 2023-04-03 DIAGNOSIS — E03.9: ICD-10-CM

## 2023-04-03 DIAGNOSIS — Z91.041: ICD-10-CM

## 2023-04-03 DIAGNOSIS — Z79.899: ICD-10-CM

## 2023-04-03 DIAGNOSIS — Z90.49: ICD-10-CM

## 2023-04-03 DIAGNOSIS — J45.909: ICD-10-CM

## 2023-04-03 DIAGNOSIS — K40.90: Primary | ICD-10-CM
